# Patient Record
Sex: FEMALE | Employment: STUDENT | URBAN - METROPOLITAN AREA
[De-identification: names, ages, dates, MRNs, and addresses within clinical notes are randomized per-mention and may not be internally consistent; named-entity substitution may affect disease eponyms.]

---

## 2018-05-31 ENCOUNTER — HOSPITAL ENCOUNTER (EMERGENCY)
Facility: CLINIC | Age: 23
Discharge: HOME | End: 2018-05-31
Attending: EMERGENCY MEDICINE | Admitting: EMERGENCY MEDICINE
Payer: COMMERCIAL

## 2018-05-31 VITALS
WEIGHT: 137 LBS | SYSTOLIC BLOOD PRESSURE: 111 MMHG | TEMPERATURE: 98.7 F | OXYGEN SATURATION: 100 % | DIASTOLIC BLOOD PRESSURE: 66 MMHG

## 2018-05-31 DIAGNOSIS — S61.211A LACERATION OF LEFT INDEX FINGER WITHOUT FOREIGN BODY WITHOUT DAMAGE TO NAIL, INITIAL ENCOUNTER: ICD-10-CM

## 2018-05-31 PROCEDURE — 99282 EMERGENCY DEPT VISIT SF MDM: CPT | Performed by: EMERGENCY MEDICINE

## 2018-05-31 PROCEDURE — 12001 RPR S/N/AX/GEN/TRNK 2.5CM/<: CPT | Mod: Z6 | Performed by: EMERGENCY MEDICINE

## 2018-05-31 PROCEDURE — 99282 EMERGENCY DEPT VISIT SF MDM: CPT | Mod: 25 | Performed by: EMERGENCY MEDICINE

## 2018-05-31 PROCEDURE — 12001 RPR S/N/AX/GEN/TRNK 2.5CM/<: CPT | Performed by: EMERGENCY MEDICINE

## 2018-05-31 RX ORDER — BUPIVACAINE HYDROCHLORIDE 5 MG/ML
INJECTION, SOLUTION EPIDURAL; INTRACAUDAL
Status: DISCONTINUED
Start: 2018-05-31 | End: 2018-05-31 | Stop reason: HOSPADM

## 2018-05-31 RX ORDER — BUPIVACAINE HYDROCHLORIDE 5 MG/ML
1 INJECTION, SOLUTION PERINEURAL ONCE
Status: DISCONTINUED | OUTPATIENT
Start: 2018-05-31 | End: 2018-05-31 | Stop reason: HOSPADM

## 2018-05-31 ASSESSMENT — ENCOUNTER SYMPTOMS
ABDOMINAL PAIN: 0
WOUND: 1
SHORTNESS OF BREATH: 0
FEVER: 0

## 2018-05-31 NOTE — DISCHARGE INSTRUCTIONS
Please make an appointment to follow up with [facility] (phone: [phone]) as needed.    Return if redness or bleeding that won't stop with pressure.          *LACERATION (All: sutures, staples, tape, glue)  A laceration is a cut through the skin. This will usually require stitches (sutures) or staples if it is deep. Minor cuts may be treated with a tape closure ( Steri-Strips ) or ermabond skin glue.    HOME CARE:  1. EXTREMITY, FACE or TRUNK WOUNDS: Keep the wound clean and dry. If a bandage was applied and it becomes wet or dirty, replace it. Otherwise, leave it in place for the first 24 hours.    You may remove the bandage to shower as usual after the first 24 hours, but do not soak the area in water (no swimming) until the stitches or staples are removed.    If Dermabond skin adhesive was used, do not scratch, rub or pick at the adhesive film. Do not place tape directly over the film. Do not apply liquid, ointment or creams to the wound while the film is in place. Do not clean the wound with peroxide and do not apply ointments. Avoid activities that cause heavy sweating until the film has fallen off. Protect the wound from prolonged exposure to sunlight or tanning lamps. You may shower as usual but do not soak the wound in water (no baths or swimming). The film will fall off by itself in 5-10 days.  2. You may use acetaminophen (Tylenol) 650-1000 mg every 6 hours or ibuprofen (Motrin, Advil) 600 mg every 6-8 hours with food to control pain, if you are able to take these medicines. [NOTE: If you have chronic liver or kidney disease or ever had a stomach ulcer or GI bleeding, talk with your doctor before using these medicines.]  Use sunscreen on the area for 6 months after the wound heals to keep the scar from getting darker.   FOLLOW UP: Most skin wounds heal within ten days. Mouth and facial wounds heal within five days. However, even with proper treatment, a wound infection may sometimes  occur. Therefore, you should check the wound daily for signs of infection listed below.  Stitches should be removed from the face within five days; stitches and staples should be removed from other parts of the body within 7-10 days. Unless you are told to come back to the emergency room, you may have your doctor or urgent care remove the stitches. If dissolving stitches were used in the mouth, these will fall out or dissolve without the need for removal. If tape closures ( Steri-Strips ) were used, remove them yourself if they have not fallen off after 7 days. If Dermabond skin glue was used, the film will fall off by itself in 5-10 days.   GET PROMPT MEDICAL ATTENTION if any of the following occur:    Increasing pain in the wound    Redness, swelling or pus coming from the wound    Fever over 101 F (38.3 C) oral    If stitches or staples come apart or fall out or if Steri-Strips fall off before seven days    If the wound edges re-open    Bleeding not controlled by direct pressure    4558-6987 [employer], [address] [place] [address], [place] [address]. All rights reserved. This information is not intended as a substitute for professional medical care. Always follow your healthcare professional's instructions.

## 2018-05-31 NOTE — ED AVS SNAPSHOT
[facility], Emergency Department    [address] [address]    Phone:  [phone]                                       Loretta Le   MRN: [id number]    Department:  [facility], Emergency Department   Date of Visit:  5/31/2018           Patient Information     Date Of Birth          1995        Your diagnoses for this visit were:     Laceration of left index finger without foreign body without damage to nail, initial encounter        You were seen by Brandan Garcia MD.        Discharge Instructions       Please make an appointment to follow up with [facility] (phone: [phone]) as needed.    Return if redness or bleeding that won't stop with pressure.          *LACERATION (All: sutures, staples, tape, glue)  A laceration is a cut through the skin. This will usually require stitches (sutures) or staples if it is deep. Minor cuts may be treated with a tape closure ( Steri-Strips ) or ermabond skin glue.    HOME CARE:  1. EXTREMITY, FACE or TRUNK WOUNDS: Keep the wound clean and dry. If a bandage was applied and it becomes wet or dirty, replace it. Otherwise, leave it in place for the first 24 hours.    You may remove the bandage to shower as usual after the first 24 hours, but do not soak the area in water (no swimming) until the stitches or staples are removed.    If Dermabond skin adhesive was used, do not scratch, rub or pick at the adhesive film. Do not place tape directly over the film. Do not apply liquid, ointment or creams to the wound while the film is in place. Do not clean the wound with peroxide and do not apply ointments. Avoid activities that cause heavy sweating until the film has fallen off. Protect the wound from prolonged exposure to sunlight or tanning lamps. You may shower as usual but do not soak the wound in water (no baths or swimming). The film will fall off by itself in 5-10 days.  2. You may use acetaminophen (Tylenol) 650-1000 mg  every 6 hours or ibuprofen (Motrin, Advil) 600 mg every 6-8 hours with food to control pain, if you are able to take these medicines. [NOTE: If you have chronic liver or kidney disease or ever had a stomach ulcer or GI bleeding, talk with your doctor before using these medicines.]  Use sunscreen on the area for 6 months after the wound heals to keep the scar from getting darker.   FOLLOW UP: Most skin wounds heal within ten days. Mouth and facial wounds heal within five days. However, even with proper treatment, a wound infection may sometimes occur. Therefore, you should check the wound daily for signs of infection listed below.  Stitches should be removed from the face within five days; stitches and staples should be removed from other parts of the body within 7-10 days. Unless you are told to come back to the emergency room, you may have your doctor or urgent care remove the stitches. If dissolving stitches were used in the mouth, these will fall out or dissolve without the need for removal. If tape closures ( Steri-Strips ) were used, remove them yourself if they have not fallen off after 7 days. If Dermabond skin glue was used, the film will fall off by itself in 5-10 days.   GET PROMPT MEDICAL ATTENTION if any of the following occur:    Increasing pain in the wound    Redness, swelling or pus coming from the wound    Fever over 101 F (38.3 C) oral    If stitches or staples come apart or fall out or if Steri-Strips fall off before seven days    If the wound edges re-open    Bleeding not controlled by direct pressure    4056-6359 [employer], [address] [place] [address], [place] [address]. All rights reserved. This information is not intended as a substitute for professional medical care. Always follow your healthcare professional's instructions.            24 Hour Appointment Hotline       To make an appointment at any [facility], call [phone] ([phone]). If you don't have a family doctor  "or clinic, we will help you find one. [facility] are conveniently located to serve the needs of you and your family.             Review of your medicines      Notice     You have not been prescribed any medications.            Orders Needing Specimen Collection     None      Pending Results     No orders found from 2018 to 2018.            Pending Culture Results     No orders found from 2018 to 2018.            Pending Results Instructions     If you had any lab results that were not finalized at the time of your Discharge, you can call the ED Lab Result RN at [phone]. You will be contacted by this team for any positive Lab results or changes in treatment. The nurses are available 7 days a week from 10A to 6:30P.  You can leave a message 24 hours per day and they will return your call.        Thank you for choosing [facility]       Thank you for choosing [facility] for your care. Our goal is always to provide you with excellent care. Hearing back from our patients is one way we can continue to improve our services. Please take a few minutes to complete the written survey that you may receive in the mail after you visit with us. Thank you!        [employer] Information     [employer] lets you send messages to your doctor, view your test results, renew your prescriptions, schedule appointments and more. To sign up, go to [email or web address] . Click on \"Log in\" on the left side of the screen, which will take you to the Welcome page. Then click on \"Sign up Now\" on the right side of the page.     You will be asked to enter the access code listed below, as well as some personal information. Please follow the directions to create your username and password.     Your access code is: [email or web address]  Expires: 2018 10:49 AM     Your access code will  in 90 days. If you need help or a new code, please call your [facility] clinic or [phone].        Care EveryWhere ID     This is your Care " EveryWhere ID. This could be used by other organizations to access your [facility] medical records  [id number]        Equal Access to Services     HAYLEE DAUGHERTY : Derik Orantes, napoleon jack, tess rm, starr olivier. So [facility] [phone].    ATENCIÓN: Si habla español, tiene a quan disposición servicios gratuitos de asistencia lingüística. Llame al [phone].    We comply with applicable federal civil rights laws and [place] laws. We do not discriminate on the basis of race, color, national origin, age, disability, sex, sexual orientation, or gender identity.            After Visit Summary       This is your record. Keep this with you and show to your community pharmacist(s) and doctor(s) at your next visit.

## 2018-05-31 NOTE — ED PROVIDER NOTES
History     Chief Complaint   Patient presents with     Laceration     HPI  Loretta Le is a 22 year old female who presents to the Emergency Department today for evaluation of a laceration. The patient states that she was cutting an orange this morning around 9 AM, about 45 minutes ago, when she accidentally cut her left index and left middle finger laterally across the posterior distal phalanxes. The patient denies any weakness or numbness. The patient states that she is up to date on her tetanus and upon review of [facility] the patient last received her tetanus in 2014.     I have reviewed the Medications, Allergies, Past Medical and Surgical History, and Social History in the Epic system.    History reviewed. No pertinent past medical history.    Past Surgical History:   Procedure Laterality Date     ENT SURGERY         No family history on file.    Social History   Substance Use Topics     Smoking status: Never Smoker     Smokeless tobacco: Never Used     Alcohol use Yes      Comment: Socially        Current Facility-Administered Medications   Medication     BUPivacaine (MARCAINE) 0.5% injection     bupivacaine (PF) (MARCAINE) 0.5 % injection     No current outpatient prescriptions on file.      Not on File     Review of Systems   Constitutional: Negative for fever.   Respiratory: Negative for shortness of breath.    Cardiovascular: Negative for chest pain.   Gastrointestinal: Negative for abdominal pain.   Skin: Positive for wound (laceration to distal left index and middle finger).   All other systems reviewed and are negative.      Physical Exam   BP: 114/69  Heart Rate: 66  Temp: 98.7  F (37.1  C)  Weight: 62.1 kg (137 lb)  SpO2: 98 %      Physical Exam   Constitutional: She is oriented to person, place, and time. She appears well-developed and well-nourished. No distress.   HENT:   Head: Normocephalic and atraumatic.   Eyes: No scleral icterus.   Neck: Normal range of motion. Neck supple.    Musculoskeletal:        Hands:  Neurological: She is alert and oriented to person, place, and time.   Skin: Skin is warm and dry. No rash noted. She is not diaphoretic. No erythema. No pallor.       ED Course   9:50 AM  The patient was seen and examined by Dr. Garcia in Room 22.    ED Course     Procedures         [facility] Procedure Note        Laceration Repair:    Performed by: Brandan Garcia  Authorized by: Brandan Garcia  Consent given by: Patient who states understanding of the procedure being performed after discussing the risks, benefits and alternatives.    Preparation: Patient was prepped and draped in usual sterile fashion.  Irrigation solution: saline    Body area: Left index and middle finger  Laceration length: 0.6 cm  Contamination: The wound is not contaminated.  Foreign bodies:none  Tendon involvement: none  Anesthesia: Digital block  Local anesthetic: Bupivacaine 0.5%  Anesthetic total: 1ml    Debridement: none  Skin closure: Closed with Wound adhesive  Technique: Wound adhesive  Approximation: close  Approximation difficulty: simple    Patient tolerance: Patient tolerated the procedure well with no immediate complications.      Critical Care time:  none             Labs Ordered and Resulted from Time of ED Arrival Up to the Time of Departure from the ED - No data to display         Assessments & Plan (with Medical Decision Making)   The patient has no signs of injury to the deeper structures.  She has partial-thickness lacerations on the index and middle finger of her left hand which goes up to the nail on the index finger but does not injure it.  Initially she had bleeding with flexion however once the wound was numbed and cleaned well there is no significant gaping or complete interruption of the dermis.  The patient was amenable to Dermabond and tolerated the closure well.  She was given wound care instructions and will follow up with her doctor as needed.    I  have reviewed the nursing notes.    I have reviewed the findings, diagnosis, plan and need for follow up with the patient.    New Prescriptions    No medications on file       Final diagnoses:   None   I, Adama Meyer, am serving as a trained medical scribe to document services personally performed by Alverto Garcia MD, based on the provider's statements to me.   IAlverto MD, was physically present and have reviewed and verified the accuracy of this note documented by Adama Meyer.     5/31/2018   [facility], [place], EMERGENCY DEPARTMENT     Brandan Garcia MD  05/31/18 1043

## 2018-05-31 NOTE — ED AVS SNAPSHOT
[facility], [place], Emergency Department    [address] [address]    Phone:  [phone]                                       Loretta Le   MRN: [id number]    Department:  [facility], Emergency Department   Date of Visit:  5/31/2018           After Visit Summary Signature Page     I have received my discharge instructions, and my questions have been answered. I have discussed any challenges I see with this plan with the nurse or doctor.    ..........................................................................................................................................  Patient/Patient Representative Signature      ..........................................................................................................................................  Patient Representative Print Name and Relationship to Patient    ..................................................               ................................................  Date                                            Time    ..........................................................................................................................................  Reviewed by Signature/Title    ...................................................              ..............................................  Date                                                            Time

## 2018-12-18 ENCOUNTER — DOCUMENTATION ONLY (OUTPATIENT)
Dept: DERMATOLOGY | Facility: CLINIC | Age: 23
End: 2018-12-18

## 2018-12-18 NOTE — PROGRESS NOTES
Records received from Loretta Le. Records placed in chart prep for Dr. Mendez to review. Appointment scheduled for 01/02/2019.

## 2019-01-02 ENCOUNTER — OFFICE VISIT (OUTPATIENT)
Dept: DERMATOLOGY | Facility: CLINIC | Age: 24
End: 2019-01-02
Payer: COMMERCIAL

## 2019-01-02 DIAGNOSIS — D22.9 MULTIPLE BENIGN NEVI: Primary | ICD-10-CM

## 2019-01-02 DIAGNOSIS — Z12.83 SKIN CANCER SCREENING: ICD-10-CM

## 2019-01-02 DIAGNOSIS — D48.5 NEOPLASM OF UNCERTAIN BEHAVIOR OF SKIN: ICD-10-CM

## 2019-01-02 ASSESSMENT — PAIN SCALES - GENERAL
PAINLEVEL: NO PAIN (0)
PAINLEVEL: NO PAIN (0)

## 2019-01-02 NOTE — PATIENT INSTRUCTIONS

## 2019-01-02 NOTE — NURSING NOTE
Dermatology Rooming Note    Loretta Le's goals for this visit include:   Chief Complaint   Patient presents with     Skin Check     Skin check, Loretta states she has some moles that concern her.      Ratna Zepeda LPN

## 2019-01-02 NOTE — PROGRESS NOTES
[facility] Dermatology Note      Dermatology Problem List:  0. NUB, central upper back- s/p bx 1/2/19  1. Skin cancer screening, 1/2/19    Encounter Date: Jan 2, 2019    CC:  Chief Complaint   Patient presents with     Skin Check     Skin check, Loretta states she has some moles that concern her.      History of Present Illness:  Ms. Loretta Le is a 23 year old female who presents in self referral for a skin check as a new patient. She admits to frequent sun exposure over her lifetime, as she is an avid . She admits to tanning bed use in her lifetime, approximately 10 times.She has had moles on her back, chest and thigh biopsied in the past, but these were all found benign.Today she reports a painful mole on her back. This mole has changed colors since previously, it has become pinker in color.    Last year she was living in [place]. During this time she started using a [employer] brand deodorant, which caused her to break out in an itchy rash in both of her arm pits. When she returned to the [place] the [place] made products also caused the rash to persist. Recently she found a brand of organic deodorant, which has not resulted in a rash. She is unsure why this itchy rash occurred and is interested in treatment methods if applicable. Otherwise the patient reports no additional painful, bleeding, nonhealing or pruritic lesions and denies any new or changing moles.    Past Medical History:   Patient Active Problem List   Diagnosis     Knee pain     No past medical history on file.  Past Surgical History:   Procedure Laterality Date     ENT SURGERY         Social History:  Patient reports that  has never smoked. she has never used smokeless tobacco. She reports that she drinks alcohol. She reports that she does not use drugs.   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   Mother had moles removed but no known  history skin cancer in the family.     Medications:  Current Outpatient Medications   Medication Sig Dispense Refill     levonorgestrel (MIRENA) 20 MCG/24HR IUD 1 each by Intrauterine route once         No Known Allergies    Review of Systems:  -Skin/Heme New Pt: The patient admits to frequent sun exposure. The patient denies excessive scarring or problems healing except as per HPI. The patient denies excessive bleeding. Skin as per HPI. No additional skin concerns.  -Constitutional: Otherwise feeling well today, in usual state of health.    Physical exam:  Vitals: There were no vitals taken for this visit.  GEN: This is a well developed, well-nourished female in no acute distress, in a pleasant mood.    SKIN: Total skin excluding the undergarment areas was performed. The exam included the head/face, neck, both arms, chest, back, abdomen, both legs, digits and/or nails. Significant for:   -Multiple regular brown pigmented macules and papules are identified on the trunk and extremities.  - There is an 8 mm pink soft papule on her central upper back    - No active lesions to bilateral axilla   -No other lesions of concern on areas examined.       Impression/Plan:  1. Neoplasm of uncertain behavior on the central upper back. The differential diagnosis includes inflamed nevus vs dysplastic nevus vs other.   Shave biopsy: After discussion of benefits and risks including but not limited to bleeding, infection, scar, incomplete removal, recurrence, and non-diagnostic biopsy, written consent and photographs were obtained. The area was cleaned with isopropyl alcohol. 1.0 mL of 1% lidocaine with epinephrine was injected to obtain adequate anesthesia of the lesion on the central upper back. A shave biopsy was performed. Hemostasis was achieved with aluminium chloride. Vaseline and a sterile dressing were applied. The patient tolerated the procedure and no complications were noted. The patient was provided with verbal and  written post care instructions.     2. Multiple clinically benign nevi on the trunk and extremities     ABCDs of melanoma were discussed and self skin checks were advised.    Sun precaution was advised including the use of sun screens of SPF 30 or higher, sun protective clothing, and avoidance of tanning beds.    3. Hx irritant dermatitis per pt hx, no active eruption today    Recommend avoidance of irritating deodorant products    Return to clinic if symptoms return and/or become persistent or bothersome     Follow-up in 1-2 years, earlier pending biopsy or for new/changing lesions.     Staff Involved:  Scribe/Staff    Scribe Disclosure:   IAnnie, am serving as a scribe to document services personally performed by Linette Mendez PA-C, based on data collection and the provider's statements to me.      Provider Disclosure:   The documentation recorded by the scribe accurately reflects the services I personally performed and the decisions made by me.    All risks, benefits and alternatives were discussed with patient.  Patient is in agreement and understands the assessment and plan.  All questions were answered.  Sun Screen Education was given.   Return to Clinic in 1-2 yrs or sooner as needed.   Linette Mendez PA-C   [facility]

## 2019-01-02 NOTE — LETTER
1/2/2019       RE: Loretta Le  [address] [facility] [address]     Dear Colleague,    Thank you for referring your patient, Loretta Le, to the [facility] DERMATOLOGY at [facility]. Please see a copy of my visit note below.    [facility] Dermatology Note      Dermatology Problem List:  0. NUB, central upper back- s/p bx 1/2/19  1. Skin cancer screening, 1/2/19    Encounter Date: Jan 2, 2019    CC:  Chief Complaint   Patient presents with     Skin Check     Skin check, Loretta states she has some moles that concern her.      History of Present Illness:  Ms. Loretta Le is a 23 year old female who presents in self referral for a skin check as a new patient. She admits to frequent sun exposure over her lifetime, as she is an avid . She admits to tanning bed use in her lifetime, approximately 10 times.She has had moles on her back, chest and thigh biopsied in the past, but these were all found benign.Today she reports a painful mole on her back. This mole has changed colors since previously, it has become pinker in color.    Last year she was living in [facility] [place]. During this time she started using a Slovenian brand deodorant, which caused her to break out in an itchy rash in both of her arm pits. When she returned to the [place] the [place] made products also caused the rash to persist. Recently she found a brand of organic deodorant, which has not resulted in a rash. She is unsure why this itchy rash occurred and is interested in treatment methods if applicable. Otherwise the patient reports no additional painful, bleeding, nonhealing or pruritic lesions and denies any new or changing moles.    Past Medical History:   Patient Active Problem List   Diagnosis     Knee pain     No past medical history on file.  Past Surgical History:   Procedure Laterality Date     ENT SURGERY         Social History:  Patient reports that  has never  smoked. she has never used smokeless tobacco. She reports that she drinks alcohol. She reports that she does not use drugs.   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   Mother had moles removed but no known history skin cancer in the family.     Medications:  Current Outpatient Medications   Medication Sig Dispense Refill     levonorgestrel (MIRENA) 20 MCG/24HR IUD 1 each by Intrauterine route once         No Known Allergies    Review of Systems:  -Skin/Heme New Pt: The patient admits to frequent sun exposure. The patient denies excessive scarring or problems healing except as per HPI. The patient denies excessive bleeding. Skin as per HPI. No additional skin concerns.  -Constitutional: Otherwise feeling well today, in usual state of health.    Physical exam:  Vitals: There were no vitals taken for this visit.  GEN: This is a well developed, well-nourished female in no acute distress, in a pleasant mood.    SKIN: Total skin excluding the undergarment areas was performed. The exam included the head/face, neck, both arms, chest, back, abdomen, both legs, digits and/or nails. Significant for:   -Multiple regular brown pigmented macules and papules are identified on the trunk and extremities.  - There is an 8 mm pink soft papule on her central upper back    - No active lesions to bilateral axilla   -No other lesions of concern on areas examined.       Impression/Plan:  1. Neoplasm of uncertain behavior on the central upper back. The differential diagnosis includes inflamed nevus vs dysplastic nevus vs other.   Shave biopsy: After discussion of benefits and risks including but not limited to bleeding, infection, scar, incomplete removal, recurrence, and non-diagnostic biopsy, written consent and photographs were obtained. The area was cleaned with isopropyl alcohol. 1.0 mL of 1% lidocaine with epinephrine was injected to obtain adequate  anesthesia of the lesion on the central upper back. A shave biopsy was performed. Hemostasis was achieved with aluminium chloride. Vaseline and a sterile dressing were applied. The patient tolerated the procedure and no complications were noted. The patient was provided with verbal and written post care instructions.     2. Multiple clinically benign nevi on the trunk and extremities     ABCDs of melanoma were discussed and self skin checks were advised.    Sun precaution was advised including the use of sun screens of SPF 30 or higher, sun protective clothing, and avoidance of tanning beds.    3. Hx irritant dermatitis per pt hx, no active eruption today    Recommend avoidance of irritating deodorant products    Return to clinic if symptoms return and/or become persistent or bothersome     Follow-up in 1-2 years, earlier pending biopsy or for new/changing lesions.     Staff Involved:  Scribe/Staff    Scribe Disclosure:   Annie PLUNKETT, am serving as a scribe to document services personally performed by Linette Mendez PA-C, based on data collection and the provider's statements to me.    Provider Disclosure:   The documentation recorded by the scribe accurately reflects the services I personally performed and the decisions made by me.    All risks, benefits and alternatives were discussed with patient.  Patient is in agreement and understands the assessment and plan.  All questions were answered.  Sun Screen Education was given.   Return to Clinic in 1-2 yrs or sooner as needed.     Linette Mendez PA-C   [facility]

## 2019-01-07 LAB — COPATH REPORT: NORMAL

## 2019-02-05 ENCOUNTER — MEDICAL CORRESPONDENCE (OUTPATIENT)
Dept: HEALTH INFORMATION MANAGEMENT | Facility: CLINIC | Age: 24
End: 2019-02-05

## 2019-02-05 ENCOUNTER — ANCILLARY PROCEDURE (OUTPATIENT)
Dept: GENERAL RADIOLOGY | Facility: CLINIC | Age: 24
End: 2019-02-05
Attending: FAMILY MEDICINE
Payer: COMMERCIAL

## 2019-02-05 ENCOUNTER — TRANSFERRED RECORDS (OUTPATIENT)
Dept: HEALTH INFORMATION MANAGEMENT | Facility: CLINIC | Age: 24
End: 2019-02-05

## 2019-02-05 ENCOUNTER — OFFICE VISIT (OUTPATIENT)
Dept: ORTHOPEDICS | Facility: CLINIC | Age: 24
End: 2019-02-05
Payer: COMMERCIAL

## 2019-02-05 VITALS — RESPIRATION RATE: 16 BRPM | DIASTOLIC BLOOD PRESSURE: 65 MMHG | HEART RATE: 65 BPM | SYSTOLIC BLOOD PRESSURE: 134 MMHG

## 2019-02-05 DIAGNOSIS — S43.001A SUBLUXATION OF RIGHT SHOULDER JOINT, INITIAL ENCOUNTER: Primary | ICD-10-CM

## 2019-02-05 DIAGNOSIS — S43.001A SUBLUXATION OF RIGHT SHOULDER JOINT, INITIAL ENCOUNTER: ICD-10-CM

## 2019-02-05 NOTE — LETTER
2/5/2019      RE: Loretta Le  [address] [place] [address]       HPI:   Right Shoulder pain:   Location: Superior shoulder  Duration: 6 weeks ago  Injury? Inciting activity? Playing [employer], her shoulder extended and twisted, abduction of shoulder, swimming, overhead motions   Radiation: Denies  Numbness/tingling? Denies  Weakness? Lifting overhead  Instability? Yes, feels like its going to slip out  Clicking/ catching? Catching  Imaging? None  Treatment? Ice    No prior hx of shoulder injury.      Pt is a 23 year old female here today for:     HPI:   Right Shoulder pain:   Location: [place] shoulder  Duration: 6 weeks ago  Injury? Inciting activity? Playing [employer], her shoulder extended and twisted, abduction of shoulder, swimming, overhead motions   Radiation: Denies  Numbness/tingling? Denies  Weakness? Lifting overhead  Instability? Yes, feels like its going to slip out  Clicking/ catching? Catching  Imaging? None  Treatment? Ice    Past Medical History:   Diagnosis Date     Medical history non-contributory       Past Surgical History:   Procedure Laterality Date     ENT SURGERY       HC TOOTH EXTRACTION W/FORCEP  2015     TONSILLECTOMY      7th grade      Current Outpatient Medications   Medication Sig Dispense Refill     levonorgestrel (MIRENA) 20 MCG/24HR IUD 1 each by Intrauterine route once        No Known Allergies   Social History     Tobacco Use     Smoking status: Never Smoker     Smokeless tobacco: Never Used   Substance Use Topics     Alcohol use: Yes     Comment: Socially      Drug use: No      No family history on file.   ROS:   Gen- no fevers/chills   Rheum - no morning stiffness   Derm - no rash/ redness   Neuro - no numbness, no tingling   Remainder of ROS negative.     Exam:   /65 (BP Location: Right arm, Patient Position: Sitting, Cuff Size: Adult Regular)   Pulse 65   Resp 16        R Shoulder:   Inspection: asymmetry? No; dyskinesis? NO   ROM:   Active -  forward flexion - full/ abduction - full/ int rot - symmetric/ ext rot - symmetric   Passive- forward flexion - full/ abduction - full   Strength: deltoid/supraspinatous - 5/5; infraspinatous/ teres minor - 5/5; subscapularis - 5/5   Maneuvers:   RTC - empty can - +; painful arc - +; lift off - neg   Impingement - Seymour -neg; Neer- neg   AC - cross arm - neg   Biceps - Speed's - neg; Yergason's - neg   Labrum - Del Cid's - +; Rose shear - neg   Instability - Apprehension - ++   Palpation: AC - neg; Acromion/ supraspinatus - neg; scapula - neg; bicipital groove - neg     Xray shoulder - 2/5/19 [place] [facility]    Neg      (S43.001A) Subluxation of right shoulder joint, initial encounter  (primary encounter diagnosis)  Comment: exam consistent w/ subluxation w/ continued laxity/ instability; check MRI; f/u after scan; natural hx of shoulder dislocations and anticipatory guidance given  Plan: X-ray lt Shoulder G/E 3 vw, MR Shoulder Right w/o Contrast            James Rosen MD

## 2019-02-06 NOTE — PROGRESS NOTES
Pt is a 23 year old female here today for:     HPI:   Right Shoulder pain:   Location: Superior shoulder  Duration: 6 weeks ago  Injury? Inciting activity? Playing [employer], her shoulder extended and twisted, abduction of shoulder, swimming, overhead motions   Radiation: Denies  Numbness/tingling? Denies  Weakness? Lifting overhead  Instability? Yes, feels like its going to slip out  Clicking/ catching? Catching  Imaging? None  Treatment? Ice    Past Medical History:   Diagnosis Date     Medical history non-contributory       Past Surgical History:   Procedure Laterality Date     ENT SURGERY       HC TOOTH EXTRACTION W/FORCEP  2015     TONSILLECTOMY      7th grade      Current Outpatient Medications   Medication Sig Dispense Refill     levonorgestrel (MIRENA) 20 MCG/24HR IUD 1 each by Intrauterine route once        No Known Allergies   Social History     Tobacco Use     Smoking status: Never Smoker     Smokeless tobacco: Never Used   Substance Use Topics     Alcohol use: Yes     Comment: Socially      Drug use: No      No family history on file.   ROS:   Gen- no fevers/chills   Rheum - no morning stiffness   Derm - no rash/ redness   Neuro - no numbness, no tingling   Remainder of ROS negative.     Exam:   /65 (BP Location: Right arm, Patient Position: Sitting, Cuff Size: Adult Regular)   Pulse 65   Resp 16        R Shoulder:   Inspection: asymmetry? No; dyskinesis? NO   ROM:   Active - forward flexion - full/ abduction - full/ int rot - symmetric/ ext rot - symmetric   Passive- forward flexion - full/ abduction - full   Strength: deltoid/supraspinatous - 5/5; infraspinatous/ teres minor - 5/5; subscapularis - 5/5   Maneuvers:   RTC - empty can - +; painful arc - +; lift off - neg   Impingement - Seymour -neg; Neer- neg   AC - cross arm - neg   Biceps - Speed's - neg; Yergason's - neg   Labrum - Del Cid's - +; Rose shear - neg   Instability - Apprehension - ++   Palpation: AC - neg; Acromion/ supraspinatus -  neg; scapula - neg; bicipital groove - neg     [place] [facility]      (S43.001A) Subluxation of right shoulder joint, initial encounter  (primary encounter diagnosis)  Comment: exam consistent w/ subluxation w/ continued laxity/ instability; check MRI; f/u after scan; natural hx of shoulder dislocations and anticipatory guidance given  Plan: X-ray lt Shoulder G/E 3 vw, MR Shoulder Right w/o Contrast            James Rosen MD  February 5, 2019  6:57 PM

## 2019-02-06 NOTE — PROGRESS NOTES
HPI:   Right Shoulder pain:   Location: [place] shoulder  Duration: 6 weeks ago  Injury? Inciting activity? Playing [employer], her shoulder extended and twisted, abduction of shoulder, swimming, overhead motions   Radiation: Denies  Numbness/tingling? Denies  Weakness? Lifting overhead  Instability? Yes, feels like its going to slip out  Clicking/ catching? Catching  Imaging? None  Treatment? Ice    No prior hx of shoulder injury.

## 2019-02-13 ENCOUNTER — ANCILLARY PROCEDURE (OUTPATIENT)
Dept: MRI IMAGING | Facility: CLINIC | Age: 24
End: 2019-02-13
Attending: FAMILY MEDICINE
Payer: COMMERCIAL

## 2019-02-13 DIAGNOSIS — S43.001A SUBLUXATION OF RIGHT SHOULDER JOINT, INITIAL ENCOUNTER: ICD-10-CM

## 2019-02-19 ENCOUNTER — OFFICE VISIT (OUTPATIENT)
Dept: ORTHOPEDICS | Facility: CLINIC | Age: 24
End: 2019-02-19
Payer: COMMERCIAL

## 2019-02-19 VITALS — HEIGHT: 64 IN | WEIGHT: 140 LBS | BODY MASS INDEX: 23.9 KG/M2

## 2019-02-19 DIAGNOSIS — S43.001D SHOULDER SUBLUXATION, RIGHT, SUBSEQUENT ENCOUNTER: Primary | ICD-10-CM

## 2019-02-19 ASSESSMENT — MIFFLIN-ST. JEOR: SCORE: 1376.07

## 2019-02-19 NOTE — LETTER
2/19/2019      RE: Loretta Le  [address] [place] [address]       Loretta Le is a 23 year old female, here today for a follow up of her right shoulder to review MRI results.     Pt is a 23 year old female last seen on 2/5/19 here for follow up of:     R shoulder - here to review MRI  Pain is stable.  No episodes of instability since last visit    Past Medical History:   Diagnosis Date     Medical history non-contributory       Current Outpatient Medications   Medication Sig Dispense Refill     levonorgestrel (MIRENA) 20 MCG/24HR IUD 1 each by Intrauterine route once        No Known Allergies     ROS:   Gen- no fevers/chills   Rheum - no morning stiffness   Derm - no rash/ redness   Neuro - no numbness, no tingling   Remainder of ROS negative.     Exam:     Deferred      (S43.001D) Shoulder subluxation, right, subsequent encounter  (primary encounter diagnosis)  Comment: imaging reviewed; will have her do formal PT; f/u in 6 wks; if no better, could consider arthrogram study; precautions/ anticipatory guidance given  Plan: PHYSICAL THERAPY REFERRAL (Internal)            James Rosen MD  February 19, 2019  7:18 AM

## 2019-02-19 NOTE — PROGRESS NOTES
Pt is a 23 year old female last seen on 2/5/19 here for follow up of:     R shoulder - here to review MRI  Pain is stable.  No episodes of instability since last visit    Past Medical History:   Diagnosis Date     Medical history non-contributory       Current Outpatient Medications   Medication Sig Dispense Refill     levonorgestrel (MIRENA) 20 MCG/24HR IUD 1 each by Intrauterine route once        No Known Allergies     ROS:   Gen- no fevers/chills   Rheum - no morning stiffness   Derm - no rash/ redness   Neuro - no numbness, no tingling   Remainder of ROS negative.     Exam:     Deferred      (S43.001D) Shoulder subluxation, right, subsequent encounter  (primary encounter diagnosis)  Comment: imaging reviewed; will have her do formal PT; f/u in 6 wks; if no better, could consider arthrogram study; precautions/ anticipatory guidance given  Plan: PHYSICAL THERAPY REFERRAL (Internal)            James Rosen MD  February 19, 2019  7:18 AM

## 2019-02-19 NOTE — PROGRESS NOTES
Loretta Rey is a 23 year old female, here today for a follow up of her right shoulder to review MRI results.

## 2019-03-04 ENCOUNTER — THERAPY VISIT (OUTPATIENT)
Dept: PHYSICAL THERAPY | Facility: CLINIC | Age: 24
End: 2019-03-04
Payer: COMMERCIAL

## 2019-03-04 DIAGNOSIS — S43.001D SHOULDER SUBLUXATION, RIGHT, SUBSEQUENT ENCOUNTER: ICD-10-CM

## 2019-03-04 DIAGNOSIS — M25.511 ACUTE PAIN OF RIGHT SHOULDER: ICD-10-CM

## 2019-03-04 PROCEDURE — 97161 PT EVAL LOW COMPLEX 20 MIN: CPT | Mod: GP | Performed by: PHYSICAL THERAPIST

## 2019-03-04 PROCEDURE — 97110 THERAPEUTIC EXERCISES: CPT | Mod: GP | Performed by: PHYSICAL THERAPIST

## 2019-03-04 PROCEDURE — 97112 NEUROMUSCULAR REEDUCATION: CPT | Mod: GP | Performed by: PHYSICAL THERAPIST

## 2019-03-04 NOTE — PROGRESS NOTES
[facility] Initial Evaluation  Subjective:  The history is provided by medical records.   Loretta Le is a 23 year old female with a right shoulder condition.                 and reported as 1/10.                General health as reported by patient is excellent.  Pertinent medical history includes:  Implaned devices and depression.    Other surgeries include:  Other (Tonsils/adenoid, wisdom teeth).    Current occupation is Student.    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                              Objective:  System    Physical Exam    General     ROS    Assessment/Plan:

## 2019-03-04 NOTE — PROGRESS NOTES
[facility] Initial Evaluation  Subjective:  The history is provided by the patient.   Loretta Le is a 23 year old female with a right shoulder condition.  Condition occurred with:  Other (shoulder being pulled into bad position).  Condition occurred: during recreation/sport ([employer]).  This is a new condition  December 27, 2018 - R shoulder possible subluxation while playing [employer], reports being in an adducted position. Slowly got more sore as the night went, and has just not gone away. Does not recall any pop or if anything subluxed during injury. No previous history of shoulder issues. Reports overall shoulder just feels unstable.  Bothers her with swimming, overhead motions   Is currently training for a triatholon in August. Was swimming but has rested since seeing MD (1 month). .    Patient reports pain:  Other (general shoulder).  Radiates to: none.  Pain is described as stabbing and is intermittent Pain Scale: 9/10 at worst.  Associated symptoms:  Dislocating/subluxing (feels unstable). Pain is the same all the time.  Symptoms are exacerbated by using arm at shoulder level, using arm behind back, using arm overhead and lifting and relieved by other (avoiding bad positions).  Since onset symptoms are gradually improving.  Special tests:  X-ray and MRI (xray- normal. MRI (non-arthrographic study) - mild strains, edema - no tears).  Previous treatment: none.  Improvement with previous treatment: NA.  General health as reported by patient is excellent.                                              Objective:  Standing Alignment:      Shoulder/UE:  Scapular winging R and rounded shoulders                                       Shoulder Evaluation:  ROM:  AROM:    Flexion:  Left:  WNL    Right:  WNL    Abduction:  Left: WNL   Right:  WNL - ERP      External Rotation:  Left:  WNL    Right:  WNL            Extension/Internal Rotation:  Left:  WNL    Right:  WNL          Strength:  : mid  trap: L 4+/5, R 4/5. Low Trap: L 4+/5, R 4-/5. Serratus ant: L 5/5 R 5-/5. ER at 90/90: L 5/5, R 4+/5, IR at 90/90: L 5/5, R 5-/5.  Flexion: Left:5/5   Pain:    Right: 5/5     Pain:     Abduction:  Left: 5/5  Pain:    Right: 5-/5     Pain:    Internal Rotation:  Left:5/5     Pain:    Right: 5/5     Pain:  External Rotation:   Left:5/5     Pain:   Right:5-/5     Pain:            Stability Testing:      Right shoulder stability positive testing:Apprehension and Relocation  Special Tests:        Right shoulder negative for the following special tests:Impingement  Palpation:        Right shoulder tenderness not present at:Acrimioclavicular; Supraspinatus; Infraspinatus; Levator or Upper Trap  Mobility Tests:  Mobility wnl shoulder: right scapular winging with shoulder ABD and CKC pushop on wall.              Scapulohumeral rhythm right:  Normal  Scapulohumeral rhythm right:  Hypermobile                                   General     ROS    Assessment/Plan:    Patient is a 23 year old female with right side shoulder complaints.    Patient has the following significant findings with corresponding treatment plan.                Diagnosis 1:  Right shoulder pain/instability    Pain -  manual therapy, self management, education and home program  Decreased strength - therapeutic exercise, therapeutic activities and home program  Decreased proprioception - neuro re-education, therapeutic activities and home program  Impaired muscle performance - neuro re-education and home program  Decreased function - therapeutic activities and home program  Impaired posture - neuro re-education, therapeutic activities and home program    Therapy Evaluation Codes:   1) History comprised of:   Personal factors that impact the plan of care:      None.    Comorbidity factors that impact the plan of care are:      None.     Medications impacting care: None.  2) Examination of Body Systems comprised of:   Body structures and functions that impact  the plan of care:      Shoulder.   Activity limitations that impact the plan of care are:      Dressing, Lifting, Sleeping and reaching.  3) Clinical presentation characteristics are:   Stable/Uncomplicated.  4) Decision-Making    Low complexity using standardized patient assessment instrument and/or measureable assessment of functional outcome.  Cumulative Therapy Evaluation is: Low complexity.    Previous and current functional limitations:  (See Goal Flow Sheet for this information)    Short term and Long term goals: (See Goal Flow Sheet for this information)     Communication ability:  Patient appears to be able to clearly communicate and understand verbal and written communication and follow directions correctly.  Treatment Explanation - The following has been discussed with the patient:   RX ordered/plan of care  Anticipated outcomes  Possible risks and side effects  This patient would benefit from PT intervention to resume normal activities.   Rehab potential is good.    Frequency:  2 X week, once daily  Duration:  for 1 weeks tapering to 1 X a week over 8 weeks  Discharge Plan:  Achieve all LTG.  Independent in home treatment program.  Reach maximal therapeutic benefit.    Please refer to the daily flowsheet for treatment today, total treatment time and time spent performing 1:1 timed codes.

## 2019-03-06 ENCOUNTER — THERAPY VISIT (OUTPATIENT)
Dept: PHYSICAL THERAPY | Facility: CLINIC | Age: 24
End: 2019-03-06
Payer: COMMERCIAL

## 2019-03-06 DIAGNOSIS — M25.511 ACUTE PAIN OF RIGHT SHOULDER: ICD-10-CM

## 2019-03-06 PROCEDURE — 97110 THERAPEUTIC EXERCISES: CPT | Mod: GP | Performed by: PHYSICAL THERAPIST

## 2019-03-06 PROCEDURE — 97112 NEUROMUSCULAR REEDUCATION: CPT | Mod: GP | Performed by: PHYSICAL THERAPIST

## 2019-03-09 ENCOUNTER — HEALTH MAINTENANCE LETTER (OUTPATIENT)
Age: 24
End: 2019-03-09

## 2019-03-26 ENCOUNTER — THERAPY VISIT (OUTPATIENT)
Dept: PHYSICAL THERAPY | Facility: CLINIC | Age: 24
End: 2019-03-26
Payer: COMMERCIAL

## 2019-03-26 DIAGNOSIS — M25.511 ACUTE PAIN OF RIGHT SHOULDER: ICD-10-CM

## 2019-03-26 PROCEDURE — 97112 NEUROMUSCULAR REEDUCATION: CPT | Mod: GP | Performed by: PHYSICAL THERAPIST

## 2019-03-26 PROCEDURE — 97110 THERAPEUTIC EXERCISES: CPT | Mod: GP | Performed by: PHYSICAL THERAPIST

## 2019-04-01 ENCOUNTER — THERAPY VISIT (OUTPATIENT)
Dept: PHYSICAL THERAPY | Facility: CLINIC | Age: 24
End: 2019-04-01
Payer: COMMERCIAL

## 2019-04-01 DIAGNOSIS — M25.511 ACUTE PAIN OF RIGHT SHOULDER: ICD-10-CM

## 2019-04-01 PROCEDURE — 97112 NEUROMUSCULAR REEDUCATION: CPT | Mod: GP | Performed by: PHYSICAL THERAPIST

## 2019-04-09 ENCOUNTER — THERAPY VISIT (OUTPATIENT)
Dept: PHYSICAL THERAPY | Facility: CLINIC | Age: 24
End: 2019-04-09
Payer: COMMERCIAL

## 2019-04-09 DIAGNOSIS — M25.511 ACUTE PAIN OF RIGHT SHOULDER: ICD-10-CM

## 2019-04-09 PROCEDURE — 97112 NEUROMUSCULAR REEDUCATION: CPT | Mod: GP | Performed by: PHYSICAL THERAPIST

## 2019-04-25 ENCOUNTER — THERAPY VISIT (OUTPATIENT)
Dept: PHYSICAL THERAPY | Facility: CLINIC | Age: 24
End: 2019-04-25
Payer: COMMERCIAL

## 2019-04-25 DIAGNOSIS — M25.511 ACUTE PAIN OF RIGHT SHOULDER: ICD-10-CM

## 2019-04-25 PROCEDURE — 97112 NEUROMUSCULAR REEDUCATION: CPT | Mod: GP | Performed by: PHYSICAL THERAPIST

## 2019-04-25 PROCEDURE — 97110 THERAPEUTIC EXERCISES: CPT | Mod: GP | Performed by: PHYSICAL THERAPIST

## 2019-05-15 ENCOUNTER — THERAPY VISIT (OUTPATIENT)
Dept: PHYSICAL THERAPY | Facility: CLINIC | Age: 24
End: 2019-05-15
Payer: COMMERCIAL

## 2019-05-15 DIAGNOSIS — M25.511 ACUTE PAIN OF RIGHT SHOULDER: ICD-10-CM

## 2019-05-15 PROCEDURE — 97110 THERAPEUTIC EXERCISES: CPT | Mod: GP | Performed by: PHYSICAL THERAPIST

## 2019-05-15 PROCEDURE — 97112 NEUROMUSCULAR REEDUCATION: CPT | Mod: GP | Performed by: PHYSICAL THERAPIST

## 2019-08-14 ENCOUNTER — ANCILLARY PROCEDURE (OUTPATIENT)
Dept: GENERAL RADIOLOGY | Facility: CLINIC | Age: 24
End: 2019-08-14
Attending: FAMILY MEDICINE
Payer: COMMERCIAL

## 2019-08-14 ENCOUNTER — OFFICE VISIT (OUTPATIENT)
Dept: ORTHOPEDICS | Facility: CLINIC | Age: 24
End: 2019-08-14
Payer: COMMERCIAL

## 2019-08-14 VITALS — HEIGHT: 64 IN | WEIGHT: 141 LBS | BODY MASS INDEX: 24.07 KG/M2

## 2019-08-14 DIAGNOSIS — M79.671 RIGHT FOOT PAIN: Primary | ICD-10-CM

## 2019-08-14 ASSESSMENT — MIFFLIN-ST. JEOR: SCORE: 1384.57

## 2019-08-14 NOTE — PROGRESS NOTES
SPORTS & ORTHOPEDIC WALK-IN VISIT 8/14/2019    Primary Care Physician:     Reason for visit:     What part of your body is injured / painful?  right foot    What caused the injury /pain? Ran Triathlon on Sunday     How long ago did your injury occur or pain begin? Sunday 8/11/19    What are your most bothersome symptoms? Pain    How would you characterize your symptom?  Pinching     What makes your symptoms better? Rest    What makes your symptoms worse? Walking    Have you been previously seen for this problem? No    Medical History:    Any recent changes to your medical history? No    Any new medication prescribed since last visit? No    Have you had surgery on this body part before? No    Social History:    Occupation:      Handedness: Right    Exercise: Most days/week    Review of Systems:    Do you have fever, chills, weight loss? No    Do you have any vision problems? No    Do you have any chest pain or edema? No    Do you have any shortness of breath or wheezing?  No    Do you have stomach problems? No    Do you have any numbness or focal weakness? No    Do you have diabetes? No    Do you have problems with bleeding or clotting? No    Do you have an rashes or other skin lesions? No

## 2019-08-14 NOTE — LETTER
8/14/2019       RE: Loretta Le  [address] [facility] [address]     Dear Colleague,    Thank you for referring your patient, Loretta Le, to the [facility] SPORTS AND ORTHOPAEDIC WALK IN CLINIC at [facility]. Please see a copy of my visit note below.          SPORTS & ORTHOPEDIC WALK-IN VISIT 8/14/2019    Primary Care Physician:     Reason for visit:     What part of your body is injured / painful?  right foot    What caused the injury /pain? Ran Triathlon on Sunday     How long ago did your injury occur or pain begin? Sunday 8/11/19    What are your most bothersome symptoms? Pain    How would you characterize your symptom?  Pinching     What makes your symptoms better? Rest    What makes your symptoms worse? Walking    Have you been previously seen for this problem? No    Medical History:    Any recent changes to your medical history? No    Any new medication prescribed since last visit? No    Have you had surgery on this body part before? No    Social History:    Occupation:      Handedness: Right    Exercise: Most days/week    Review of Systems:    Do you have fever, chills, weight loss? No    Do you have any vision problems? No    Do you have any chest pain or edema? No    Do you have any shortness of breath or wheezing?  No    Do you have stomach problems? No    Do you have any numbness or focal weakness? No    Do you have diabetes? No    Do you have problems with bleeding or clotting? No    Do you have an rashes or other skin lesions? No           CHIEF COMPLAINT:  Pain of the Right Foot       HISTORY OF PRESENT ILLNESS  Ms. Le is a pleasant 23 year old year old female who presents to clinic today with acute pain of lateral right foot.  Loretta explains that she ran a triathalon on Sunday, 8/11/19 and noticed pinching type pain in the evening.  Lateral aspect of her foot.  Worse with walking and activity and improved with rest.  No  "swelling, ecchymosis or erythema.  Patient has had no history of stress fractures.        What part of your body is injured / painful?  right foot    What caused the injury /pain? Ran Triathlon on Sunday     How long ago did your injury occur or pain begin? Sunday 8/11/19    What are your most bothersome symptoms? Pain    How would you characterize your symptom?  Pinching     What makes your symptoms better? Rest    What makes your symptoms worse? Walking    Have you been previously seen for this problem? No    Additional history: as documented    MEDICAL HISTORY  Patient Active Problem List   Diagnosis     Knee pain     Skin cancer screening     Multiple benign nevi     Acute pain of right shoulder       Current Outpatient Medications   Medication Sig Dispense Refill     levonorgestrel (MIRENA) 20 MCG/24HR IUD 1 each by Intrauterine route once         No Known Allergies    No family history on file.    Additional medical/Social/Surgical histories reviewed in [facility] and updated as appropriate.     REVIEW OF SYSTEMS (8/19/2019)  A 10-point review of systems was obtained and is negative except for as noted in the HPI.      PHYSICAL EXAM  Ht 1.626 m (5' 4\")   Wt 64 kg (141 lb)   BMI 24.20 kg/m       General  - normal appearance, in no obvious distress  CV  - normal pulses at posterior tib and dorsalis pedis  Pulm  - normal respiratory pattern, non-labored  Musculoskeletal - Right ankle  - stance: normal gait without limp, no significant overpronation, arches maintained  - inspection: no swelling or effusion,  normal bone and joint alignment, no obvious deformity  - palpation: Tenderness at lateral foot overlying cuboid and into tip of 5th MT, malleoli and tarsal bones non-tender, no metatarsal shaft tenderness.  - ROM: normal dorsiflexion, plantar flexion, painful inversion and eversion.  - strength: 5/5 in all planes, 4/5 eversion  - special tests:  (-) anterior drawer  Neuro  - no sensory or motor deficit, grossly " normal coordination, normal muscle tone  Skin  - no ecchymosis, erythema, warmth, or induration, no obvious rash  Psych  - interactive, appropriate, normal mood and affect    IMAGING :XR Foot RT 3V: Final results and radiologist's interpretation, available in the [facility] health record. Images were reviewed with the patient/family members in the office today. My personal interpretation of the performed imaging is no acute osseous abnormality visualized.     ASSESSMENT & PLAN  Ms. Le is a 23 year old year old female who presents to clinic today with acute lateral foot pain which began after running triathlon on 8/11/19.  Examination concerning for peroneal tendinitis of right lateral foot into base of fifth MT.  Less likely early stress reaction.  No limping or significant pain in shoe.  Will continue resting, ice, and home exercises for peroneal tendinopathy.  May start with swimming and gradually increase exercise regimen.    Diagnosis: Acute pain of right foot    Follow up if persisting with return to activity, consider repeat xrays vs boot.    It was a pleasure seeing Loretta today.    Saturnino Souza DO, CAQSM  Primary Care Sports Medicine

## 2019-08-14 NOTE — PATIENT INSTRUCTIONS
PERONEAL TENDONITIS  What is a peroneal tendon injury?   A peroneal tendon injury is a problem with the tendons and muscles on the outer side of your lower leg and foot. Tendons are strong bands of tissue that attach muscle to bone. The peroneal tendons help keep your foot and ankle stable when you walk.   Tendons can be injured suddenly or they may be slowly damaged over time. You can have tiny or partial tears in your tendon. If you have a complete tear of your tendon, it is called a rupture. Other tendon injuries may be called a strain, tendinosis, or tendonitis.  What is the cause?   Peroneal injuries can be caused by:  Overuse of the tendon from a sport or work activity that causes your foot and ankle to roll inward, like when you run on sloped surfaces or run in shoes that are getting worn out on the outside of the heel.   A sudden activity that forces your foot upward toward your shin, like landing on your feet after a fall, or rolling your ankle on a rock while running.   What are the symptoms?   You may hear a pop or a snap when the injury happens. You may have pain and swelling on the outer side of your lower leg or ankle.   How is it diagnosed?   Your healthcare provider will examine you and ask about your symptoms, activities, and medical history. You may have X-rays or other scans.  How is it treated?   While you are recovering from your injury, you will need to change your sport or activity to one that will not make your condition worse. For example, you may need to swim instead of run.   Your healthcare provider may recommend stretching and strengthening exercises to help you heal.  Use an elastic bandage or an ankle brace as directed by your provider. You may need to use crutches until you can walk without pain.   The pain often gets better within a few weeks with self-care, but some injuries may take several months or longer to heal. It s important to follow all of your healthcare provider s  instructions.  How can I take care of myself?   To help relieve swelling and pain:  Put an ice pack, gel pack, or package of frozen vegetables wrapped in a cloth, on the area every 3 to 4 hours for up to 20 minutes at a time.   Do ice massage. To do this, first freeze water in a Styrofoam cup, then peel the top of the cup away to expose the ice. Hold the bottom of the cup and rub the ice over your tendon for 5 to 10 minutes. Do this several times a day while you have pain.   Keep your ankle up on a pillow when you sit or lie down.   Take pain medicine, such as acetaminophen, ibuprofen, or other medicine as directed by your provider. Nonsteroidal anti-inflammatory medicines (NSAIDs), such as ibuprofen, may cause stomach bleeding and other problems. These risks increase with age. Read the label and take as directed. Unless recommended by your healthcare provider, do not take for more than 10 days.  Moist heat may help relax your muscles and make it easier to move your leg. Put moist heat on the injured area for 10 to 15 minutes at a time before you do warm-up and stretching exercises. Moist heat includes heat patches or moist heating pads that you can purchase at most [employer], a wet washcloth or towel that has been heated in the dryer, or a hot shower. Don t use heat if you have swelling.   Follow your healthcare provider's instructions, including any exercises recommended by your provider. Ask your provider:  How and when you will hear your test results   How long it will take to recover   What activities you should avoid, including how much you can lift, and when you can return to your normal activities   How to take care of yourself at home   What symptoms or problems you should watch for and what to do if you have them  Make sure you know when you should come back for a checkup.  How can I help prevent a peroneal tendon injury?   Warm-up exercises and stretching before activities can help prevent injuries. For  example, do exercises that keep your ankles and leg muscles strong. If your leg or ankle hurts after exercise, putting ice on it may help keep it from getting injured.   Follow safety rules and use any protective equipment recommended for your work or sport. For example, wear high-top athletic shoes or a supportive ankle brace. When you run, choose level surfaces and avoid rocks or holes.  Developed by [employer]  Published by [employer]  Copyright  2014 [employer] and/or one of its subsidiaries. All rights reserved.                Peroneal Tendon Exercises  You may start these exercises when you can stand comfortably on your injured leg with your heel resting on the floor and your full weight evenly distributed on both legs.  Towel stretch: Sit on a hard surface with your injured leg stretched out in front of you. Loop a towel around your toes and the ball of your foot and pull the towel toward your body keeping your leg straight. Hold this position for 15 to 30 seconds and then relax. Repeat 3 times.  When you don't feel much of a stretch using the towel, you can start the following exercises.  Standing calf stretch: Stand facing a wall with your hands on the wall at about eye level. Keep your injured leg back with your heel on the floor. Keep the other leg forward with the knee bent. Turn your back foot slightly inward (as if you were pigeon-toed). Slowly lean into the wall until you feel a stretch in the back of your calf. Hold the stretch for 15 to 30 seconds. Return to the starting position. Repeat 3 times. Do this exercise several times each day.   Standing soleus stretch: Stand facing a wall with your hands on the wall at about chest height. Keep your injured leg back with your heel on the floor. Keep the other leg forward with the knee bent. Turn your back foot slightly inward (as if you were pigeon-toed). Bend your back knee slightly and gently lean into the wall until you feel a stretch in  the lower calf of your injured leg. Hold the stretch for 15 to 30 seconds. Return to the starting position. Repeat 3 times.   Achilles stretch: Stand with the ball of one foot on a stair. Reach for the step below with your heel until you feel a stretch in the arch of your foot. Hold this position for 15 to 30 seconds and then relax. Repeat 3 times.   Heel raise: Stand behind a chair or counter with both feet flat on the floor. Using the chair or counter as a support, rise up onto your toes and hold for 5 seconds. Then slowly lower yourself down without holding onto the support. (It's OK to keep holding onto the support if you need to.) When this exercise becomes less painful, try doing this exercise while you are standing on the injured leg only. Repeat 15 times. Do 2 sets of 15. Rest 30 seconds between sets.   Step-up: Stand with the foot of your injured leg on a support 3 to 5 inches (8 to 13 centimeters) high --like a small step or block of wood. Keep your other foot flat on the floor. Shift your weight onto the injured leg on the support. Straighten your injured leg as the other leg comes off the floor. Return to the starting position by bending your injured leg and slowly lowering your uninjured leg back to the floor. Do 2 sets of 15.   Resisted ankle eversion: Sit with both legs stretched out in front of you, with your feet about a shoulder's width apart. Tie a loop in one end of elastic tubing. Put the foot of your injured leg through the loop so that the tubing goes around the arch of that foot and wraps around the outside of the other foot. Hold onto the other end of the tubing with your hand to provide tension. Turn the foot of your injured leg up and out. Make sure you keep your other foot still so that it will allow the tubing to stretch as you move the foot of your injured leg. Return to the starting position. Do 2 sets of 15.   Balance and reach exercises: Stand next to a chair with your injured leg  farther from the chair. The chair will provide support if you need it. Stand on the foot of your injured leg and bend your knee slightly. Try to raise the arch of this foot while keeping your big toe on the floor. Keep your foot in this position.   With the hand that is farther away from the chair, reach forward in front of you by bending at the waist. Avoid bending your knee any more as you do this. Repeat this 15 times. To make the exercise more challenging, reach farther in front of you. Do 2 sets of 15.   While keeping your arch raised, reach the hand that is farther away from the chair across your body toward the chair. The farther you reach, the more challenging the exercise. Do 2 sets of 15.  If you have access to a wobble board, do the following exercises:  Wobble board exercises   Stand on a wobble board with your feet shoulder-width apart.   Rock the board forwards and backwards 30 times, then side to side 30 times. Hold on to a chair if you need support.   Rotate the wobble board around so that the edge of the board is in contact with the floor at all times. Do this 30 times in a clockwise and then a counterclockwise direction.   Balance on the wobble board for as long as you can without letting the edges touch the floor. Try to do this for 2 minutes without touching the floor.   Rotate the wobble board in clockwise and counterclockwise circles, but do not let the edge of the board touch the floor.   When you have mastered the wobble exercises standing on both legs, try repeating them while standing on just your injured leg. After you are able to do these exercises on one leg, try to do them with your eyes closed. Make sure you have something nearby to support you in case you lose your balance.  Developed by [employer].  Published by [employer].  Copyright  2014 [employer] and/or one of its subsidiaries. All rights reserved.

## 2019-08-19 NOTE — PROGRESS NOTES
"CHIEF COMPLAINT:  Pain of the Right Foot       HISTORY OF PRESENT ILLNESS  Ms. Le is a pleasant 23 year old year old female who presents to clinic today with acute pain of lateral right foot.  Loretta explains that she ran a triathalon on Sunday, 8/11/19 and noticed pinching type pain in the evening.  Lateral aspect of her foot.  Worse with walking and activity and improved with rest.  No swelling, ecchymosis or erythema.  Patient has had no history of stress fractures.        What part of your body is injured / painful?  right foot    What caused the injury /pain? Ran Triathlon on Sunday     How long ago did your injury occur or pain begin? Sunday 8/11/19    What are your most bothersome symptoms? Pain    How would you characterize your symptom?  Pinching     What makes your symptoms better? Rest    What makes your symptoms worse? Walking    Have you been previously seen for this problem? No    Additional history: as documented    MEDICAL HISTORY  Patient Active Problem List   Diagnosis     Knee pain     Skin cancer screening     Multiple benign nevi     Acute pain of right shoulder       Current Outpatient Medications   Medication Sig Dispense Refill     levonorgestrel (MIRENA) 20 MCG/24HR IUD 1 each by Intrauterine route once         No Known Allergies    No family history on file.    Additional medical/Social/Surgical histories reviewed in [facility] and updated as appropriate.     REVIEW OF SYSTEMS (8/19/2019)  A 10-point review of systems was obtained and is negative except for as noted in the HPI.      PHYSICAL EXAM  Ht 1.626 m (5' 4\")   Wt 64 kg (141 lb)   BMI 24.20 kg/m      General  - normal appearance, in no obvious distress  CV  - normal pulses at posterior tib and dorsalis pedis  Pulm  - normal respiratory pattern, non-labored  Musculoskeletal - Right ankle  - stance: normal gait without limp, no significant overpronation, arches maintained  - inspection: no swelling or effusion,  normal bone and joint " alignment, no obvious deformity  - palpation: Tenderness at lateral foot overlying cuboid and into tip of 5th MT, malleoli and tarsal bones non-tender, no metatarsal shaft tenderness.  - ROM: normal dorsiflexion, plantar flexion, painful inversion and eversion.  - strength: 5/5 in all planes, 4/5 eversion  - special tests:  (-) anterior drawer  Neuro  - no sensory or motor deficit, grossly normal coordination, normal muscle tone  Skin  - no ecchymosis, erythema, warmth, or induration, no obvious rash  Psych  - interactive, appropriate, normal mood and affect    IMAGING :XR Foot RT 3V: Final results and radiologist's interpretation, available in the [facility] health record. Images were reviewed with the patient/family members in the office today. My personal interpretation of the performed imaging is no acute osseous abnormality visualized.     ASSESSMENT & PLAN  Ms. Le is a 23 year old year old female who presents to clinic today with acute lateral foot pain which began after running triathlon on 8/11/19.  Examination concerning for peroneal tendinitis of right lateral foot into base of fifth MT.  Less likely early stress reaction.  No limping or significant pain in shoe.  Will continue resting, ice, and home exercises for peroneal tendinopathy.  May start with swimming and gradually increase exercise regimen.    Diagnosis: Acute pain of right foot    Follow up if persisting with return to activity, consider repeat xrays vs boot.    It was a pleasure seeing [facility] today.    Saturnino Souza DO, CAQSM  Primary Care Sports Medicine

## 2019-10-19 PROBLEM — M25.511 ACUTE PAIN OF RIGHT SHOULDER: Status: RESOLVED | Noted: 2019-03-04 | Resolved: 2019-10-19

## 2019-10-19 NOTE — PROGRESS NOTES
Discharge Note    Progress reporting period is from Mar 4, 2019 to May 15, 2019.     Loretta failed to return for next follow up visit and current status is unknown.  Please see information below for last relevant information on current status.  Patient seen for Rxs Used: 7 visits.  SUBJECTIVE  Subjective changes noted by patient:  Subjective: pt reports shoulder has been really good. did a little less HEP during finals. has not really had any pain in awhile. has been running more and no issue.  .  Current pain level is Current Pain level: 0/10.     Previous pain level was  Initial Pain level: 9/10.   Changes in function:  Yes (See Goal flowsheet attached for changes in current functional level)  Adverse reaction to treatment or activity: None    OBJECTIVE  Changes noted in objective findings: Objective: MMT: shoulder flexion 5/5, ABD 5/5, ER and IR at side 5/5. ER at 90/90 5-/5, IR at 90/90 5/5. slight right scap winging remains     ASSESSMENT/PLAN  Diagnosis: right shoulder pain/instability   DIAGNOSIS:  right shoulder pain/instability  Updated problem list and treatment plan:   Decreased strength - HEP  Impaired muscle performance - HEP  Decreased proprioception - HEP  STG/LTGs have been met or progress has been made towards goals:  Yes, please see goal flowsheet for most current information  Assessment of Progress: current status is unknown.  Last current status: Assessment of progress: Pt is progressing well   Self Management Plans:  HEP  I have re-evaluated this patient and find that the nature, scope, duration and intensity of the therapy is appropriate for the medical condition of the patient.  Loretta continues to require the following intervention to meet STG and LTG's:  HEP.    Recommendations:  Discharge with current home program.  Patient to follow up with MD as needed.    Please refer to the daily flowsheet for treatment today, total treatment time and time spent performing 1:1 timed codes.

## 2019-11-07 ENCOUNTER — HEALTH MAINTENANCE LETTER (OUTPATIENT)
Age: 24
End: 2019-11-07

## 2020-11-29 ENCOUNTER — HEALTH MAINTENANCE LETTER (OUTPATIENT)
Age: 25
End: 2020-11-29

## 2021-09-25 ENCOUNTER — HEALTH MAINTENANCE LETTER (OUTPATIENT)
Age: 26
End: 2021-09-25

## 2022-01-15 ENCOUNTER — HEALTH MAINTENANCE LETTER (OUTPATIENT)
Age: 27
End: 2022-01-15

## 2022-07-27 NOTE — ED TRIAGE NOTES
Pt presents to ED with c/o finger laceration to first and second digits on left hand. Pt states she did this while cutting oranges this morning. Pt states she is up to date on t-dap    Heterosexual

## 2022-12-26 ENCOUNTER — HEALTH MAINTENANCE LETTER (OUTPATIENT)
Age: 27
End: 2022-12-26

## 2023-04-16 ENCOUNTER — HEALTH MAINTENANCE LETTER (OUTPATIENT)
Age: 28
End: 2023-04-16